# Patient Record
Sex: MALE | Race: BLACK OR AFRICAN AMERICAN | NOT HISPANIC OR LATINO | URBAN - METROPOLITAN AREA
[De-identification: names, ages, dates, MRNs, and addresses within clinical notes are randomized per-mention and may not be internally consistent; named-entity substitution may affect disease eponyms.]

---

## 2024-07-22 ENCOUNTER — APPOINTMENT (OUTPATIENT)
Dept: CARDIOLOGY | Facility: HOSPITAL | Age: 34
End: 2024-07-22
Payer: COMMERCIAL

## 2024-07-22 ENCOUNTER — HOSPITAL ENCOUNTER (EMERGENCY)
Facility: HOSPITAL | Age: 34
Discharge: LEFT BEFORE DISCHARGE | End: 2024-07-22
Attending: EMERGENCY MEDICINE
Payer: COMMERCIAL

## 2024-07-22 VITALS
OXYGEN SATURATION: 98 % | RESPIRATION RATE: 19 BRPM | HEART RATE: 74 BPM | DIASTOLIC BLOOD PRESSURE: 67 MMHG | HEIGHT: 71 IN | WEIGHT: 155 LBS | TEMPERATURE: 98 F | BODY MASS INDEX: 21.7 KG/M2 | SYSTOLIC BLOOD PRESSURE: 105 MMHG

## 2024-07-22 DIAGNOSIS — G40.919 BREAKTHROUGH SEIZURE (MULTI): Primary | ICD-10-CM

## 2024-07-22 LAB
ALBUMIN SERPL BCP-MCNC: 4.8 G/DL (ref 3.4–5)
ALP SERPL-CCNC: 38 U/L (ref 33–120)
ALT SERPL W P-5'-P-CCNC: 16 U/L (ref 10–52)
AMPHETAMINES UR QL SCN: ABNORMAL
ANION GAP SERPL CALC-SCNC: 18 MMOL/L (ref 10–20)
APAP SERPL-MCNC: <10 UG/ML
APPEARANCE UR: CLEAR
AST SERPL W P-5'-P-CCNC: 27 U/L (ref 9–39)
ATRIAL RATE: 56 BPM
BARBITURATES UR QL SCN: ABNORMAL
BASOPHILS # BLD AUTO: 0.06 X10*3/UL (ref 0–0.1)
BASOPHILS NFR BLD AUTO: 0.9 %
BENZODIAZ UR QL SCN: ABNORMAL
BILIRUB SERPL-MCNC: 0.7 MG/DL (ref 0–1.2)
BILIRUB UR STRIP.AUTO-MCNC: NEGATIVE MG/DL
BUN SERPL-MCNC: 10 MG/DL (ref 6–23)
BZE UR QL SCN: ABNORMAL
CALCIUM SERPL-MCNC: 9.6 MG/DL (ref 8.6–10.3)
CANNABINOIDS UR QL SCN: ABNORMAL
CHLORIDE SERPL-SCNC: 103 MMOL/L (ref 98–107)
CO2 SERPL-SCNC: 22 MMOL/L (ref 21–32)
COLOR UR: COLORLESS
CREAT SERPL-MCNC: 0.95 MG/DL (ref 0.5–1.3)
EGFRCR SERPLBLD CKD-EPI 2021: >90 ML/MIN/1.73M*2
EOSINOPHIL # BLD AUTO: 0.45 X10*3/UL (ref 0–0.7)
EOSINOPHIL NFR BLD AUTO: 7 %
ERYTHROCYTE [DISTWIDTH] IN BLOOD BY AUTOMATED COUNT: 13.2 % (ref 11.5–14.5)
ETHANOL SERPL-MCNC: <10 MG/DL
FENTANYL+NORFENTANYL UR QL SCN: ABNORMAL
GLUCOSE SERPL-MCNC: 81 MG/DL (ref 74–99)
GLUCOSE UR STRIP.AUTO-MCNC: NORMAL MG/DL
HCT VFR BLD AUTO: 43.1 % (ref 41–52)
HGB BLD-MCNC: 14.1 G/DL (ref 13.5–17.5)
HYALINE CASTS #/AREA URNS AUTO: ABNORMAL /LPF
IMM GRANULOCYTES # BLD AUTO: 0.02 X10*3/UL (ref 0–0.7)
IMM GRANULOCYTES NFR BLD AUTO: 0.3 % (ref 0–0.9)
KETONES UR STRIP.AUTO-MCNC: ABNORMAL MG/DL
LACTATE SERPL-SCNC: 1.1 MMOL/L (ref 0.4–2)
LEUKOCYTE ESTERASE UR QL STRIP.AUTO: NEGATIVE
LEVETIRACETAM SERPL-MCNC: <2 UG/ML (ref 10–40)
LYMPHOCYTES # BLD AUTO: 2.47 X10*3/UL (ref 1.2–4.8)
LYMPHOCYTES NFR BLD AUTO: 38.7 %
MCH RBC QN AUTO: 28.3 PG (ref 26–34)
MCHC RBC AUTO-ENTMCNC: 32.7 G/DL (ref 32–36)
MCV RBC AUTO: 86 FL (ref 80–100)
METHADONE UR QL SCN: ABNORMAL
MONOCYTES # BLD AUTO: 0.51 X10*3/UL (ref 0.1–1)
MONOCYTES NFR BLD AUTO: 8 %
MUCOUS THREADS #/AREA URNS AUTO: ABNORMAL /LPF
NEUTROPHILS # BLD AUTO: 2.88 X10*3/UL (ref 1.2–7.7)
NEUTROPHILS NFR BLD AUTO: 45.1 %
NITRITE UR QL STRIP.AUTO: NEGATIVE
NRBC BLD-RTO: 0 /100 WBCS (ref 0–0)
OPIATES UR QL SCN: ABNORMAL
OXYCODONE+OXYMORPHONE UR QL SCN: ABNORMAL
P AXIS: 43 DEGREES
P OFFSET: 167 MS
P ONSET: 118 MS
PCP UR QL SCN: ABNORMAL
PH UR STRIP.AUTO: 5.5 [PH]
PLATELET # BLD AUTO: 220 X10*3/UL (ref 150–450)
POTASSIUM SERPL-SCNC: 4 MMOL/L (ref 3.5–5.3)
PR INTERVAL: 202 MS
PROT SERPL-MCNC: 8.1 G/DL (ref 6.4–8.2)
PROT UR STRIP.AUTO-MCNC: ABNORMAL MG/DL
Q ONSET: 219 MS
QRS COUNT: 10 BEATS
QRS DURATION: 98 MS
QT INTERVAL: 406 MS
QTC CALCULATION(BAZETT): 391 MS
QTC FREDERICIA: 397 MS
R AXIS: 52 DEGREES
RBC # BLD AUTO: 4.99 X10*6/UL (ref 4.5–5.9)
RBC # UR STRIP.AUTO: ABNORMAL /UL
RBC #/AREA URNS AUTO: ABNORMAL /HPF
SALICYLATES SERPL-MCNC: <3 MG/DL
SODIUM SERPL-SCNC: 139 MMOL/L (ref 136–145)
SP GR UR STRIP.AUTO: 1.02
T AXIS: 44 DEGREES
T OFFSET: 422 MS
UROBILINOGEN UR STRIP.AUTO-MCNC: NORMAL MG/DL
VENTRICULAR RATE: 56 BPM
WBC # BLD AUTO: 6.4 X10*3/UL (ref 4.4–11.3)
WBC #/AREA URNS AUTO: ABNORMAL /HPF

## 2024-07-22 PROCEDURE — 80053 COMPREHEN METABOLIC PANEL: CPT | Performed by: EMERGENCY MEDICINE

## 2024-07-22 PROCEDURE — 81001 URINALYSIS AUTO W/SCOPE: CPT | Mod: 59 | Performed by: EMERGENCY MEDICINE

## 2024-07-22 PROCEDURE — 93005 ELECTROCARDIOGRAM TRACING: CPT

## 2024-07-22 PROCEDURE — 83605 ASSAY OF LACTIC ACID: CPT | Performed by: EMERGENCY MEDICINE

## 2024-07-22 PROCEDURE — 36415 COLL VENOUS BLD VENIPUNCTURE: CPT | Performed by: EMERGENCY MEDICINE

## 2024-07-22 PROCEDURE — 85025 COMPLETE CBC W/AUTO DIFF WBC: CPT | Performed by: EMERGENCY MEDICINE

## 2024-07-22 PROCEDURE — 99283 EMERGENCY DEPT VISIT LOW MDM: CPT | Mod: 25

## 2024-07-22 PROCEDURE — 80143 DRUG ASSAY ACETAMINOPHEN: CPT | Performed by: EMERGENCY MEDICINE

## 2024-07-22 PROCEDURE — 80177 DRUG SCRN QUAN LEVETIRACETAM: CPT | Mod: AHULAB | Performed by: EMERGENCY MEDICINE

## 2024-07-22 PROCEDURE — 80307 DRUG TEST PRSMV CHEM ANLYZR: CPT | Performed by: EMERGENCY MEDICINE

## 2024-07-22 ASSESSMENT — COLUMBIA-SUICIDE SEVERITY RATING SCALE - C-SSRS
1. IN THE PAST MONTH, HAVE YOU WISHED YOU WERE DEAD OR WISHED YOU COULD GO TO SLEEP AND NOT WAKE UP?: NO
2. HAVE YOU ACTUALLY HAD ANY THOUGHTS OF KILLING YOURSELF?: NO
6. HAVE YOU EVER DONE ANYTHING, STARTED TO DO ANYTHING, OR PREPARED TO DO ANYTHING TO END YOUR LIFE?: NO

## 2024-07-22 ASSESSMENT — PAIN - FUNCTIONAL ASSESSMENT: PAIN_FUNCTIONAL_ASSESSMENT: 0-10

## 2024-07-22 ASSESSMENT — PAIN SCALES - GENERAL
PAINLEVEL_OUTOF10: 0 - NO PAIN
PAINLEVEL_OUTOF10: 1

## 2024-07-22 NOTE — ED TRIAGE NOTES
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 He has  a HX  of SZ and takes medication for his SZ but lately  has had poor control. It is managed by his PCP.

## 2024-07-22 NOTE — ED PROVIDER NOTES
HPI   Chief Complaint   Patient presents with    Seizures       HPI  Patient is a 34-year-old male with a past medical history significant for seizures who presents emergency room with chief complaint of seizure.  Patient states that he was diagnosed with seizures 2 years ago and they normally occur in his sleep.  Usually he wakes up and in the past has run out of the house or stuff close in the toilet.  He has been seen by neurology and had a negative CT, EEG that did not show any signs of epileptiform activity, however, according to girlfriend he usually has incontinence lip or tongue biting and whole body shaking while he sleeps.  He has been started on Keppra and has been taking it steadily for over a year without any dose changes.  He has never had it while awake but this morning he was at work feeling well and all of a sudden he woke up the ambulance after coworker reportedly saw him on the floor with generalized tonic-clonic movements.  He does not know how long it lasted.  He had no incontinence this time.  He was feeling confused at first but right now is alert and oriented.  He does have some ecchymosis to the right side of the tongue where he bit himself.  He has been chewing on his tongue more last few days and states that this usually precedes a seizure event.  Of note, patient does smoke marijuana and last smoked 2 days ago.  He is otherwise not been sick, missed any doses of medication and has been sleeping appropriately.  He has not taken any stimulants.  Denies any other symptoms at this time.      PMHx: As above  PSHx: Denies pertinent  FamilyHx: Denies  SocialHx: THC use  Allergies: NKDA  Medications: See Medication Reconciliation     ROS  As above otherwise denies      Physical Exam    GENERAL: Awake and Alert, No Acute Distress  HEENT: AT/NC, PERRL, EOMI, Normal Oropharynx, No Signs of Dehydration  NECK: Normal Inspection, No JVD  CARDIOVASCULAR: RRR, No M/R/G  RESPIRATORY: CTA Bilaterally, No  Wheezes, Rales or Rhonchi, Chest Wall Non-tender  ABDOMEN: Soft, non-tender abdomen, Normal Bowel Sounds, No Distention  BACK: No CVA Tenderness  SKIN: Normal Color, Warm, Dry, No Rashes   EXTREMITIES: Non-Tender, Full ROM, No Pedal Edema  NEURO: A&O x 3, Normal Motor and Sensation, Normal Mood and Affect    Nursing Assessment and Vitals Reviewed  EKG showed a normal sinus rhythm at 56 bpm.  No significant interval prolongations or ischemic ST changes.  No axis deviation.    Medical Decision  Patient is a 34-year-old male with a past medical history significant for seizures who presents emergency room with chief complaint of seizure.  Patient states that he was diagnosed with seizures 2 years ago and they normally occur in his sleep.  Usually he wakes up and in the past has run out of the house or stuff close in the toilet.  He has been seen by neurology and had a negative CT, EEG that did not show any signs of epileptiform activity, however, according to girlfriend he usually has incontinence lip or tongue biting and whole body shaking while he sleeps.  He has been started on Keppra and has been taking it steadily for over a year without any dose changes.  He has never had it while awake but this morning he was at work feeling well and all of a sudden he woke up the ambulance after coworker reportedly saw him on the floor with generalized tonic-clonic movements.  He does not know how long it lasted.  He had no incontinence this time.  He was feeling confused at first but right now is alert and oriented.  He does have some ecchymosis to the right side of the tongue where he bit himself.  He has been chewing on his tongue more last few days and states that this usually precedes a seizure event.  Of note, patient does smoke marijuana and last smoked 2 days ago.  He is otherwise not been sick, missed any doses of medication and has been sleeping appropriately.  He has not taken any stimulants.  Denies any other  symptoms at this time.    On evaluation patient is very well-appearing and in no acute distress.  Lungs are clear and heart is regular rate and rhythm.  Abdomen is soft nontender nondistended.  He does have some ecchymosis to the right side of his tongue where he appears to have been himself but no signs of lacerations.  He does not appear to be postictal at this time.  Given history and risk factors we will perform a workup including Keppra level.    Workup for patient included labs that revealed a normal lactate.  Electrolytes within normal limits.  He has no leukocytosis or anemia.  He has trace ketones with no signs of infection in the urine.  He is positive for cannabinoids.  We have a call out to patient's neurologist, Dr. Segura at [facility] to discuss potential changes in doses.  While awaiting callback patient left without treatment complete.  He was noted to be in stable condition without any recurrence of seizure-like activity and remained alert and oriented during his stay.    Please note Dr. Izaguirre, neurology [facility], called back regarding patient after patient had left without treatment complete.  Per my conversation with her patient is no longer on Keppra as of last year.  I informed her of his presentation, neurologic exam and workup.  She will follow him as an outpatient and reach out to him later today.              Patient History   Past Medical History:   Diagnosis Date    COVID-19     COVID-19 virus infection    Renal agenesis, unilateral     Solitary kidney, congenital     Past Surgical History:   Procedure Laterality Date    OTHER SURGICAL HISTORY  01/05/2023    No history of surgery     No family history on file.  Social History     Tobacco Use    Smoking status: Not on file    Smokeless tobacco: Not on file   Substance Use Topics    Alcohol use: Not on file    Drug use: Not on file       Physical Exam   ED Triage Vitals [07/22/24 0708]   Temperature Heart Rate Respirations BP   36.7 °C (98 °F) 81  (!) 22 (!) 133/91      Pulse Ox Temp Source Heart Rate Source Patient Position   96 % Oral Monitor Lying      BP Location FiO2 (%)     Right arm --       Physical Exam      ED Course & MDM   Diagnoses as of 07/22/24 1158   Breakthrough seizure (Multi)                       Cornel Coma Scale Score: 15                        Medical Decision Making      Procedure  Procedures     Elodia Carmona MD  07/22/24 1158       Elodia Carmona MD  07/22/24 1222

## 2024-12-18 DIAGNOSIS — I73.9 CLAUDICATION OF BOTH LOWER EXTREMITIES (CMS-HCC): Primary | ICD-10-CM

## 2025-01-07 ENCOUNTER — APPOINTMENT (OUTPATIENT)
Dept: CARDIOLOGY | Facility: CLINIC | Age: 35
End: 2025-01-07
Payer: COMMERCIAL

## 2025-01-07 ENCOUNTER — HOSPITAL ENCOUNTER (OUTPATIENT)
Dept: VASCULAR MEDICINE | Facility: HOSPITAL | Age: 35
Discharge: HOME | End: 2025-01-07
Payer: COMMERCIAL

## 2025-01-07 VITALS
HEIGHT: 71 IN | HEART RATE: 66 BPM | SYSTOLIC BLOOD PRESSURE: 122 MMHG | DIASTOLIC BLOOD PRESSURE: 84 MMHG | WEIGHT: 142 LBS | BODY MASS INDEX: 19.88 KG/M2

## 2025-01-07 DIAGNOSIS — G89.29 CHRONIC MIDLINE LOW BACK PAIN WITH BILATERAL SCIATICA: Primary | ICD-10-CM

## 2025-01-07 DIAGNOSIS — M54.41 CHRONIC MIDLINE LOW BACK PAIN WITH BILATERAL SCIATICA: Primary | ICD-10-CM

## 2025-01-07 DIAGNOSIS — M54.42 CHRONIC MIDLINE LOW BACK PAIN WITH BILATERAL SCIATICA: Primary | ICD-10-CM

## 2025-01-07 DIAGNOSIS — I73.9 CLAUDICATION OF BOTH LOWER EXTREMITIES (CMS-HCC): ICD-10-CM

## 2025-01-07 PROCEDURE — 3008F BODY MASS INDEX DOCD: CPT | Performed by: INTERNAL MEDICINE

## 2025-01-07 PROCEDURE — 99202 OFFICE O/P NEW SF 15 MIN: CPT | Performed by: INTERNAL MEDICINE

## 2025-01-07 PROCEDURE — 93924 LWR XTR VASC STDY BILAT: CPT | Performed by: SURGERY

## 2025-01-07 PROCEDURE — 93924 LWR XTR VASC STDY BILAT: CPT

## 2025-01-07 RX ORDER — ERGOCALCIFEROL 1.25 MG/1
50000 CAPSULE ORAL WEEKLY
COMMUNITY
Start: 2024-12-26

## 2025-01-07 RX ORDER — LACOSAMIDE 100 MG/1
1 TABLET ORAL
COMMUNITY
Start: 2024-10-26

## 2025-01-07 RX ORDER — MIDAZOLAM 5 MG/.1ML
0.1 SPRAY NASAL ONCE
COMMUNITY
Start: 2024-12-04

## 2025-01-07 RX ORDER — SILDENAFIL 50 MG/1
50 TABLET, FILM COATED ORAL AS NEEDED
COMMUNITY
Start: 2024-05-02

## 2025-01-07 ASSESSMENT — PAIN SCALES - GENERAL: PAINLEVEL_OUTOF10: 0-NO PAIN

## 2025-01-07 NOTE — PATIENT INSTRUCTIONS
We discussed about the importance of smoking cessation  Your vascular test is normal  Please follow up as needed.

## 2025-01-07 NOTE — PROGRESS NOTES
Referred by self referral for claudication of both lower legs    History of Present Illness  Alejandro Martin is a 34 y.o. year old male patient with h/o epilepsy, scoliosis, tobacco and marijuana use. Following with neurology at [facility].   He states he has chronic low back pain with radiation to bilateral lower extremities. He reports he has no leg pain at rest, no claudication symptoms.  He completed his PVR with exercise today which was reviewed with pt.     Past Medical History  Past Medical History:   Diagnosis Date    COVID-19     COVID-19 virus infection    Renal agenesis, unilateral     Solitary kidney, congenital       Past Surgical History  Past Surgical History:   Procedure Laterality Date    OTHER SURGICAL HISTORY  01/05/2023    No history of surgery       Social History  Social History     Tobacco Use    Smoking status: Not on file    Smokeless tobacco: Not on file   Substance Use Topics    Alcohol use: Not on file    Drug use: Not on file       Family History   No family history on file.    Review of Systems  As per HPI, all other systems reviewed and negative.    Allergies:  No Known Allergies     Outpatient Medications:  No current outpatient medications      Vitals:  There were no vitals filed for this visit.    Physical Exam:  Physical Exam  Constitutional:       General: He is not in acute distress.     Appearance: Normal appearance. He is normal weight.   HENT:      Head: Normocephalic and atraumatic.   Cardiovascular:      Rate and Rhythm: Normal rate.      Pulses: Normal pulses.   Pulmonary:      Effort: No respiratory distress.   Abdominal:      General: There is no distension.   Musculoskeletal:      Right lower leg: No edema.      Left lower leg: No edema.   Neurological:      Mental Status: He is alert.           Reviewed Study(s):    Cardiac Studies  Echo: No results found for this or any previous visit.  Angiogram:     Vascular Studies   ECG 12 lead  Sinus bradycardia  Otherwise normal  ECG  No previous ECGs available  See ED provider note for full interpretation and clinical correlation  Confirmed by Trudy Denton ([id number]) on 7/22/2024 5:41:59 PM      PVR w exercise 1/7/25  PRELIMINARY CONCLUSIONS:  Right Lower PVR: No evidence of arterial occlusive disease in the right lower extremity at rest and with exercise. Multiphasic flow is noted in the right common femoral artery, right posterior tibial artery and right dorsalis pedis artery.  Left Lower PVR: No evidence of arterial occlusive disease in the left lower extremity at rest and with exercise. Multiphasic flow is noted in the left common femoral artery, left posterior tibial artery and left dorsalis pedis artery.  Exercise:Exercise completed at 0% grade and 2 1/2 miles per hour for 5 minutes. The patient experienced pain in the right calf, the left calf and bilateral thighs at 3 minutes.     Imaging & Doppler Findings:     RIGHT Lower PVR                Pressures Ratios  Right Posterior Tibial (Ankle) 143 mmHg  1.20  Right Dorsalis Pedis (Ankle)   144 mmHg  1.21     Right Ankle Post Exercise      147 mmHg  1.15        LEFT Lower PVR                Pressures Ratios  Left Posterior Tibial (Ankle) 143 mmHg  1.20  Left Dorsalis Pedis (Ankle)   131 mmHg  1.10     Left Ankle Post Exercise      148 mmHg  1.16                           Right     Left  Brachial Pressure 119 mmHg 119 mmHg        Assessment/Plan   Alejandro Martin is a 34 y.o. year old male patient with h/o epilepsy, scoliosis, tobacco and marijuana use. Following with neurology at [facility]. He reports he has no leg pain at rest, no claudication symptoms.  He completed his PVR with exercise today which was reviewed with pt. We discussed that based on his PVR study, his current symptoms are not likely vascular related.    - we discussed about the importance of smoking cessation  - follow up as needed.     Pt was seen with Dr. Barrios.    Ashly South MD   fellow     Celso Barrios,  DO